# Patient Record
Sex: FEMALE | Race: WHITE
[De-identification: names, ages, dates, MRNs, and addresses within clinical notes are randomized per-mention and may not be internally consistent; named-entity substitution may affect disease eponyms.]

---

## 2018-02-03 ENCOUNTER — HOSPITAL ENCOUNTER (EMERGENCY)
Dept: HOSPITAL 74 - JER | Age: 58
Discharge: HOME | End: 2018-02-03
Payer: COMMERCIAL

## 2018-02-03 VITALS — TEMPERATURE: 100 F | HEART RATE: 79 BPM | DIASTOLIC BLOOD PRESSURE: 80 MMHG | SYSTOLIC BLOOD PRESSURE: 144 MMHG

## 2018-02-03 VITALS — BODY MASS INDEX: 38.2 KG/M2

## 2018-02-03 DIAGNOSIS — E78.00: ICD-10-CM

## 2018-02-03 DIAGNOSIS — R91.1: ICD-10-CM

## 2018-02-03 DIAGNOSIS — R05: Primary | ICD-10-CM

## 2018-02-03 DIAGNOSIS — I10: ICD-10-CM

## 2018-02-03 DIAGNOSIS — E11.9: ICD-10-CM

## 2018-02-03 DIAGNOSIS — E07.9: ICD-10-CM

## 2018-02-03 NOTE — PDOC
History of Present Illness





- General


Stated Complaint: COUGH


Time Seen by Provider: 02/03/18 00:27


History Source: Patient


Exam Limitations: No Limitations





- History of Present Illness


Initial Comments: 





02/03/18 02:09


57-year-old female with a history of hypertension and IDDM presents to the 

emergency department complaining of a nonproductive cough 1 month as of 

tomorrow. Patient denies fever, chills, headaches, nausea/vomiting, facial pains

, nasal congestion, rhinorrhea, hemoptysis, earaches, sore throat, chest pain, 

shortness of breath, abdominal pains, flank pains, urinary symptoms. Patient 

was seen by her PMD/Dr. Abhinav Benjamin on 01/08/2018. She was prescribed 

Robitussin 2 teaspoons every 4-6 hours when necessary cough and antibiotics/Z-

Navin. Patient return back to her PMDs office 3 days later and was informed a can 

be acid reflux and was given omeprazole 20 mg daily. 2 days later, patient was 

given Medrol Dosepak. Symptoms did not subside so she returned to her PMDs 

office yesterday. Patient had blood work and was advised she will need some 

imaging.








Timing/Duration: reports: other (x 1 month)


Associated Symptoms: reports: cough.  denies: earache, fever/chills, headache, 

nasal congestion, shortness of breath, sore throat





Past History





- Past Medical History


Allergies/Adverse Reactions: 


 Allergies











Allergy/AdvReac Type Severity Reaction Status Date / Time


 


No Known Allergies Allergy   Verified 02/03/18 02:19











Home Medications: 


Ambulatory Orders





Oxycodone HCl/Acetaminophen [Percocet 5-325 mg Tablet -] 1 tab PO Q6H PRN #10 

tablet 11/14/14 


Ondansetron [Zofran *Odt*] 4 mg SL TID #30 od.tablet 11/25/15 


Pantoprazole Sodium [Protonix] 40 mg PO DAILY #30 tablet. 11/25/15 








Diabetes: Yes


HTN: Yes


Hypercholesterolemia: Yes


Thyroid Disease: Yes





- Suicide/Smoking/Psychosocial Hx


Smoking History: Never smoked


Hx Alcohol Use: No


Substance Use Type: None





**Review of Systems





- Review of Systems


Able to Perform ROS?: Yes


Comments:: 





02/03/18 00:34


CONSTITUTIONAL: 


Absent: fever, chills, diaphoresis, generalized weakness, malaise, loss of 

appetite


HEENT: 


Absent: rhinorrhea, nasal congestion, throat pain, throat swelling, difficulty 

swallowing, mouth swelling, ear pain, eye pain, visual Changes


CARDIOVASCULAR: 


Absent: chest pain, loss of consciousness, palpitations, irregular heart rate, 

peripheral edema


RESPIRATORY: 


+cough/dry


Absent: shortness of breath, dyspnea with exertion, orthopnea, wheezing, stridor

, hemoptysis


GASTROINTESTINAL:


Absent: abdominal pain, abdominal distension, nausea, vomiting, diarrhea, 

constipation, melena, hematochezia


GENITOURINARY: 


Absent: dysuria, frequency, urgency, hesitancy, hematuria, flank pain, genital 

pain


MUSCULOSKELETAL: 


Absent: myalgia, arthralgia, joint swelling


SKIN: 


Absent: rash, itching, pallor


HEMATOLOGIC/IMMUNOLOGIC: 


Absent: easy bleeding, easy bruising, lymphadenopathy, frequent infections


ENDOCRINE:


Absent: unexplained weight gain, unexplained weight loss, heat intolerance, 

cold intolerance


NEUROLOGIC: 


Absent: headache, focal weakness or paresthesias, dizziness, unsteady gait, 

seizure, mental status changes, bladder or bowel incontinence














Is the patient limited English proficient: No





*Physical Exam





- Physical Exam


Comments: 





02/03/18 00:34


GENERAL:


Well developed, well nourished. Awake and alert. No acute distress.


HEENT:


Normocephalic, atraumatic. PERRLA, EOMI. No conjunctival pallor. Sclera are non-

icteric. Moist mucous membranes. Oropharynx is clear.


NECK: 


Supple. Full ROM. No JVD. Carotid pulses 2+ and symmetric, without bruits. No 

thyromegaly. No lymphadenopathy.


CARDIOVASCULAR:


Regular rate and rhythm. No murmurs, rubs, or gallops. Distal pulses are 2+ and 

symmetric. 


PULMONARY: 


No evidence of respiratory distress. Lungs clear to auscultation bilaterally. 

No wheezing, rales or rhonchi.


ABDOMINAL:


Soft. Non-tender. Non-distended. No rebound or guarding. No organomegaly. 

Normoactive bowel sounds. 


MUSCULOSKELETAL 


Normal range of motion at all joints. No bony deformities or tenderness. No CVA 

tenderness.


EXTREMITIES: 


No cyanosis. No clubbing. No edema. No calf tenderness.


SKIN: 


Warm and dry. Normal capillary refill. No rashes. No jaundice. 


NEUROLOGICAL: 


Alert, awake, appropriate. Cranial nerves 2-12 intact. No deficits to light 

touch and temperature in face, upper extremities and lower extremities. No 

motor deficits in the in face, upper extremities and lower extremities. 

Normoreflexic in the upper and lower extremities. Normal speech. Toes are down-

going bilaterally. Gait is normal without ataxia.


PSYCHIATRIC: 


Cooperative. Good eye contact. Appropriate mood and affect.





ED Treatment Course





- RADIOLOGY


Radiograph Interpretation: 





02/03/18 03:45


CXR 2v NAD





ct chest w/o contrast:


No pneumonia, pneumothorax, pleural effusion or mediastinum lymphadenopathy. 

The thoracic aorta is normal in size and contour. The heart is normal in size 

without evidence of pericardial effusion. There is a 0.2 cm right middle lobe 

nodule. Minimal right basilar re-subsegmental atelectasis. Hepatosplenomegaly 

with fatty infiltration of the liver





*DC/Admit/Observation/Transfer


Diagnosis at time of Disposition: 


 Chronic cough, Incidental lung nodule, less than or equal to 3mm








- Discharge Dispostion


Disposition: HOME


Condition at time of disposition: Stable


Admit: No





- Referrals


Referrals: 


Abhinav Brown MD [Primary Care Provider] - 





- Patient Instructions


Printed Discharge Instructions:  Cough


Additional Instructions: 


Follow-up with your primary physician.


Over-the-counter supportive care.


Return back to the emergency department for severe/persistent or worsening 

symptoms





- Post Discharge Activity

## 2018-02-03 NOTE — PDOC
Medical Decision Making





- Medical Decision Making





02/03/18 00:44


The patient was seen and evaluated in conjunction with DEBORAH Soriano under my direct 

supervision, ancillary studies were reviewed.  I agree with the plan as 

outlined by DEBORAH Soriano. 








*DC/Admit/Observation/Transfer


Diagnosis at time of Disposition: 


 Chronic cough, Incidental lung nodule, less than or equal to 3mm








- Discharge Dispostion


Disposition: HOME


Condition at time of disposition: Stable





- Referrals


Referrals: 


Abhinav Brown MD [Primary Care Provider] - 





- Patient Instructions


Printed Discharge Instructions:  Cough


Additional Instructions: 


Follow-up with your primary physician.


Over-the-counter supportive care.


Return back to the emergency department for severe/persistent or worsening 

symptoms





- Post Discharge Activity